# Patient Record
Sex: FEMALE | Race: BLACK OR AFRICAN AMERICAN | Employment: FULL TIME | URBAN - METROPOLITAN AREA
[De-identification: names, ages, dates, MRNs, and addresses within clinical notes are randomized per-mention and may not be internally consistent; named-entity substitution may affect disease eponyms.]

---

## 2019-10-09 LAB
ANTIBODY SCREEN, EXTERNAL: NEGATIVE
HBSAG, EXTERNAL: NEGATIVE
HIV, EXTERNAL: NEGATIVE
RUBELLA, EXTERNAL: NORMAL
TYPE, ABO & RH, EXTERNAL: NORMAL

## 2019-12-30 ENCOUNTER — HOSPITAL ENCOUNTER (OUTPATIENT)
Dept: PERINATAL CARE | Age: 29
Discharge: HOME | End: 2019-12-30
Attending: OBSTETRICS & GYNECOLOGY
Payer: COMMERCIAL

## 2019-12-30 PROCEDURE — 76805 OB US >/= 14 WKS SNGL FETUS: CPT | Performed by: OBSTETRICS & GYNECOLOGY

## 2020-03-11 LAB — GRBS, EXTERNAL: NEGATIVE

## 2020-03-13 ENCOUNTER — HOSPITAL ENCOUNTER (OUTPATIENT)
Age: 30
Discharge: HOME | End: 2020-03-14
Attending: OBSTETRICS & GYNECOLOGY | Admitting: OBSTETRICS & GYNECOLOGY
Payer: COMMERCIAL

## 2020-03-13 VITALS
TEMPERATURE: 98.5 F | RESPIRATION RATE: 18 BRPM | DIASTOLIC BLOOD PRESSURE: 53 MMHG | BODY MASS INDEX: 37.77 KG/M2 | HEART RATE: 124 BPM | HEIGHT: 69 IN | SYSTOLIC BLOOD PRESSURE: 111 MMHG | WEIGHT: 255 LBS

## 2020-03-13 LAB
APPEARANCE UR: ABNORMAL
BACTERIA URNS QL MICRO: NEGATIVE /HPF
BILIRUB UR QL CFM: NEGATIVE
COLOR UR: ABNORMAL
EPITH CASTS URNS QL MICRO: ABNORMAL /LPF
GLUCOSE UR STRIP.AUTO-MCNC: NEGATIVE MG/DL
HGB UR QL STRIP: NEGATIVE
KETONES UR QL STRIP.AUTO: 80 MG/DL
LEUKOCYTE ESTERASE UR QL STRIP.AUTO: ABNORMAL
NITRITE UR QL STRIP.AUTO: NEGATIVE
PH UR STRIP: 6.5 [PH] (ref 5–8)
PROT UR STRIP-MCNC: ABNORMAL MG/DL
RBC #/AREA URNS HPF: ABNORMAL /HPF (ref 0–5)
SP GR UR REFRACTOMETRY: 1.03 (ref 1–1.03)
UA: UC IF INDICATED,UAUC: ABNORMAL
UROBILINOGEN UR QL STRIP.AUTO: 1 EU/DL (ref 0.2–1)
WBC URNS QL MICRO: ABNORMAL /HPF (ref 0–4)

## 2020-03-13 PROCEDURE — 96361 HYDRATE IV INFUSION ADD-ON: CPT

## 2020-03-13 PROCEDURE — 87086 URINE CULTURE/COLONY COUNT: CPT

## 2020-03-13 PROCEDURE — 96360 HYDRATION IV INFUSION INIT: CPT

## 2020-03-13 PROCEDURE — 81001 URINALYSIS AUTO W/SCOPE: CPT

## 2020-03-13 PROCEDURE — 75810000275 HC EMERGENCY DEPT VISIT NO LEVEL OF CARE

## 2020-03-13 PROCEDURE — 74011250636 HC RX REV CODE- 250/636: Performed by: OBSTETRICS & GYNECOLOGY

## 2020-03-13 RX ORDER — SODIUM CHLORIDE, SODIUM LACTATE, POTASSIUM CHLORIDE, CALCIUM CHLORIDE 600; 310; 30; 20 MG/100ML; MG/100ML; MG/100ML; MG/100ML
1000 INJECTION, SOLUTION INTRAVENOUS ONCE
Status: COMPLETED | OUTPATIENT
Start: 2020-03-13 | End: 2020-03-13

## 2020-03-13 RX ADMIN — SODIUM CHLORIDE, SODIUM LACTATE, POTASSIUM CHLORIDE, AND CALCIUM CHLORIDE 1000 ML: 600; 310; 30; 20 INJECTION, SOLUTION INTRAVENOUS at 20:28

## 2020-03-13 RX ADMIN — SODIUM CHLORIDE, SODIUM LACTATE, POTASSIUM CHLORIDE, AND CALCIUM CHLORIDE 1000 ML: 600; 310; 30; 20 INJECTION, SOLUTION INTRAVENOUS at 21:30

## 2020-03-14 PROCEDURE — 99283 EMERGENCY DEPT VISIT LOW MDM: CPT

## 2020-03-14 PROCEDURE — 59025 FETAL NON-STRESS TEST: CPT

## 2020-03-14 NOTE — PROGRESS NOTES
1945: Patient arrived to unit with complaints of abdominal pain. Patient oriented to room, connected to external fetal monitors and triage flow-sheet complete. She reports good fetal movement and denies vaginal bleeding and leaking of fluid. Patient reports feeling pains occasionally but denies timing them. Urine collected, apple juice color and sent. Patient reports only 1.5 bottles of water today and states she was diagnosed with a UTI in December at [facility] but did not take the antibiotics as prescribed. Water pitcher filled for patient and RN instructed patient to drink over next 30 minutes or so. 0130Vastormy Murphy MD states patient may be discharged around 9025-0090. Patient required 2+ liters LR to become reactive. 0205: RN reviewed discharge instructions with patient including fetal kick counts, pregnancy nutrition and abdominal pain in pregnancy. Patient verbalizes understanding, opportunity for questions provided. Patient knows when to follow up with her regular OB.    0217:  Patient and SO ambulating off unit after discharge.

## 2020-03-14 NOTE — H&P
History & Physical    Name: Amarilis Chery MRN: [id number]  SSN: [id number]    YOB: 1990  Age: 34 y.o. Sex: female        Subjective:     Estimated Date of Delivery: 4/10/20  OB History    Para Term  AB Living   2 1 1     1   SAB TAB Ectopic Molar Multiple Live Births             1      # Outcome Date GA Lbr Guru/2nd Weight Sex Delivery Anes PTL Lv   2 Current            1 Term  37w0d   F Vag-Spont EPI  JENNA       Ms. Brown is a  with pregnancy at 36w0d, [facility] 4/10/20 that complains of irregular uterine contractions for the past 2 weeks that have increased in intensity and regularity today. Denies leakage of vaginal fluid, vaginal bleeding, and notes good fetal movement. Has had three episodes of nausea and vomiting and one episode of diarrhea today. Denies fever, chills, body aches, runny nose, sore throat, cough, sneezing, and shortness of breath. Prenatal care is complicated by obesity. Please see prenatal records for details. No past medical history on file. No past surgical history on file. Social History     Occupational History    Not on file   Tobacco Use    Smoking status: Former Smoker     Last attempt to quit: 2019     Years since quittin.6    Smokeless tobacco: Never Used   Substance and Sexual Activity    Alcohol use: No    Drug use: No    Sexual activity: Yes     Partners: Male     Birth control/protection: None   GynHx: h/o GC  Family History   Problem Relation Age of Onset    Hypertension Mother     Cancer Maternal Grandmother        No Known Allergies  Prior to Admission medications    Medication Sig Start Date End Date Taking? Authorizing Provider   prenatal multivit-ca-min-fe-fa tab Take 1 Tab by mouth daily. Yes Provider, Historical        Review of Systems: A comprehensive review of systems was negative.     Objective:     Vitals:  Vitals:    20   BP: 111/53   Pulse: (!) 124   Resp: 18   Temp: 98.5 °F (36.9 °C)   Weight: 115.7 kg (255 lb)   Height: 5' 9\" (1.753 m)        Physical Exam:  Patient without distress.   Heart: Regular rate and rhythm or S1S2 present  Lung: clear to auscultation throughout lung fields, no wheezes, no rales, no rhonchi and normal respiratory effort  Abdomen: soft, nontender, gravid  Fundus: soft and non tender  Perineum: blood absent, amniotic fluid absent  Cervical Exam: 2/40/-3 (exam by nurse)  Lower Extremities:  - Edema No  Membranes:  Intact  Fetal Heart Rate: Baseline: 165 per minute  Variability: moderate  Accelerations: no  Decelerations: none  Uterine contractions: none    Recent Results (from the past 12 hour(s))   URINALYSIS W/ REFLEX CULTURE    Collection Time: 20  8:05 PM   Result Value Ref Range    Color DARK YELLOW      Appearance TURBID (A) CLEAR      Specific gravity 1.028 1.003 - 1.030      pH (UA) 6.5 5.0 - 8.0      Protein TRACE (A) NEG mg/dL    Glucose NEGATIVE  NEG mg/dL    Ketone 80 (A) NEG mg/dL    Blood NEGATIVE  NEG      Urobilinogen 1.0 0.2 - 1.0 EU/dL    Nitrites NEGATIVE  NEG      Leukocyte Esterase SMALL (A) NEG      WBC 5-10 0 - 4 /hpf    RBC 5-10 0 - 5 /hpf    Epithelial cells MODERATE (A) FEW /lpf    Bacteria NEGATIVE  NEG /hpf    UA:UC IF INDICATED URINE CULTURE ORDERED (A) CNI     BILIRUBIN, CONFIRM    Collection Time: 20  8:05 PM   Result Value Ref Range    Bilirubin UA, confirm NEGATIVE  NEG         Prenatal Labs:   Lab Results   Component Value Date/Time    Rubella, External IMMUNE 10/09/2019    GrBStrep, External negative 11/10/2016 08:00 AM    HBsAg, External NEGATIVE 10/09/2019    HIV, External NEGATIVE 10/09/2019    RPR, External non reactive 2016 08:00 AM    Gonorrhea, External negative 2016 08:00 AM    Chlamydia, External negative 2016 08:00 AM    ABO,Rh B POSITIVE 10/09/2019         Assessment/Plan:      at 36 wks with false labor, dehydration    Iv and po hydration  Will consider discharge home once fetal tracing has a normal baseline and is reactive.

## 2020-03-14 NOTE — DISCHARGE INSTRUCTIONS
Patient Education     Follow up at your regularly scheduled visit. Belly Pain in Pregnancy: Care Instructions  Your Care Instructions  When you're pregnant, any belly pain can be a worry. You may not want to call your doctor about every pain you have. But you don't want to miss something that is dangerous for you or your baby. Even if it feels familiar, belly pain can mean something new when you're pregnant. It's important to know when to call your doctor. It will also help to know how to care for yourself at home when your pain is not caused by anything harmful. · When belly pain is more severe or constant, see a doctor right away. · If you're sure your belly pain is a sign of labor, call your doctor. · When belly pain is brief, it's usually a normal part of pregnancy. It might be related to changes in the growing uterus. Or it could be the stretching of ligaments called round ligaments. These ligaments help support the uterus. Round ligament pain can be on either side of your belly. It can also be felt in your hips or groin. Follow-up care is a key part of your treatment and safety. Be sure to make and go to all appointments, and call your doctor if you are having problems. It's also a good idea to know your test results and keep a list of the medicines you take. How can you tell if belly pain is a sign of labor? When belly pain is caused by labor, it can feel like mild or menstrual-like cramps in your lower belly. These cramps are probably contractions. They can happen in your second or third trimester. You may also have:  · A steady, dull ache in your lower back, pelvis, or thighs. · A feeling of pressure in your pelvis or lower belly. · Changes in your vaginal discharge or a sudden release of fluid from the vagina. If you think you are in labor, call your doctor. How can you care for yourself at home? When belly pain is mild and is not a symptom of labor:  · Rest until you feel better.   · Take a warm bath. · Think about what you drink and eat:  ¨ Drink plenty of fluids. Choose water and other caffeine-free clear liquids until you feel better. ¨ Try eating small, frequent meals. If your stomach is upset, try bland, low-fat foods like plain rice, broiled chicken, toast, and yogurt. · Think about how you move if you are having brief pains from stretching of the round ligaments. ¨ Try gentle stretching. ¨ Move a little more slowly when turning in bed or getting up from a chair, so those ligaments don't stretch quickly. ¨ Lean forward a bit if you think you are going to cough or sneeze. When should you call for help? Call 911 anytime you think you may need emergency care. For example, call if:  · You have sudden, severe pain in your belly. · You have severe vaginal bleeding. Call your doctor now or seek immediate medical care if:  · You have new or worse belly pain or cramping. · You have any vaginal bleeding. · You have a fever. · You have symptoms of preeclampsia, such as:  ¨ Sudden swelling of your face, hands, or feet. ¨ New vision problems (such as dimness or blurring). ¨ A severe headache. · You think that you may be in labor. This means that you've had at least 8 contractions within 1 hour or at least 4 contractions within 20 minutes, even after you change your position and drink fluids. · You have symptoms of a urinary tract infection. These may include:  ¨ Pain or burning when you urinate. ¨ A frequent need to urinate without being able to pass much urine. ¨ Pain in the flank, which is just below the rib cage and above the waist on either side of the back. ¨ Blood in your urine. Watch closely for changes in your health, and be sure to contact your doctor if you are worried about your or your baby's health. Where can you learn more? Go to [employer]  Enter B275 in the search box to learn more about \"Belly Pain in Pregnancy: Care Instructions. \"   © 0872-6618 Healthwise, [employer]. Care instructions adapted under license by [employer] (which disclaims liability or warranty for this information). This care instruction is for use with your licensed healthcare professional. If you have questions about a medical condition or this instruction, always ask your healthcare professional. [address] any warranty or liability for your use of this information. Content Version: 61.1.885509; Current as of: November 12, 2015  Patient Education        Nutrition During Pregnancy: Care Instructions  Your Care Instructions  Healthy eating when you are pregnant is important for you and your baby. It can help you feel well and have a successful pregnancy and delivery. During pregnancy your nutrition needs increase. Even if you have excellent eating habits, your doctor may recommend a multivitamin to make sure you get enough iron and folic acid. Many pregnant women wonder how much weight they should gain. In general, women who were at a healthy weight before they became pregnant should gain between 25 and 35 pounds. Women who were overweight before pregnancy are usually advised to gain 15 to 25 pounds. Women who were underweight before pregnancy are usually advised to gain 28 to 40 pounds. Your doctor will work with you to set a weight goal that is right for you. Gaining a healthy amount of weight helps you have a healthy baby. Follow-up care is a key part of your treatment and safety. Be sure to make and go to all appointments, and call your doctor if you are having problems. It's also a good idea to know your test results and keep a list of the medicines you take. How can you care for yourself at home? · Eat plenty of fruits and vegetables. Include a variety of orange, yellow, and leafy dark-green vegetables every day. · Choose whole-grain bread, cereal, and pasta.  Good choices include whole wheat bread, whole wheat pasta, brown rice, and oatmeal.  · Get 4 or more servings of milk and milk products each day. Good choices include nonfat or low-fat milk, yogurt, and cheese. If you cannot eat milk products, you can get calcium from calcium-fortified products such as orange juice, soy milk, and tofu. Other non-milk sources of calcium include leafy green vegetables, such as broccoli, kale, mustard greens, turnip greens, bok richy, and brussels sprouts. · If you eat meat, pick lower-fat types. Good choices include lean cuts of meat and chicken or turkey without the skin. · Do not eat shark, swordfish, reyes mackerel, or tilefish. They have high levels of mercury, which is dangerous to your baby. You can eat up to 12 ounces a week of fish or shellfish that have low mercury levels. Good choices include shrimp, wild salmon, pollock, and catfish. Do not eat more than 6 ounces of tuna each week. · Heat lunch meats (such as turkey, ham, or bologna) to 165°F before you eat them. This reduces your risk of getting sick from a kind of bacteria that can be found in lunch meats. · Do not eat unpasteurized soft cheeses, such as brie, feta, fresh mozzarella, and blue cheese. They have a bacteria that could harm your baby. · Limit caffeine. If you drink coffee or tea, have no more than 1 cup a day. Caffeine is also found in kimberly. · Do not drink any alcohol. No amount of alcohol has been found to be safe during pregnancy. · Do not diet or try to lose weight. For example, do not follow a low-carbohydrate diet. If you are overweight at the start of your pregnancy, your doctor will work with you to manage your weight gain. · Tell your doctor about all vitamins and supplements you take. When should you call for help? Watch closely for changes in your health, and be sure to contact your doctor if you have any problems. Where can you learn more?   Go to http://sandie-anabel.info/  Enter Y785 in the search box to learn more about \"Nutrition During Pregnancy: Care Instructions. \"  Current as of: May 29, 2019Content Version: 12.4  © 9528-7700 [employer]. Care instructions adapted under license by [employer] (which disclaims liability or warranty for this information). If you have questions about a medical condition or this instruction, always ask your healthcare professional. [address] any warranty or liability for your use of this information. Patient Education        Counting Your Baby's Kicks: Care Instructions  Your Care Instructions  Counting your baby's kicks is one way your doctor can tell that your baby is healthy. Most women--especially in a first pregnancy--feel their baby move for the first time between 16 and 22 weeks. The movement may feel like flutters rather than kicks. Your baby may move more at certain times of the day. When you are active, you may notice less kicking than when you are resting. At your prenatal visits, your doctor will ask whether the baby is active. In your last trimester, your doctor may ask you to count the number of times you feel your baby move. Follow-up care is a key part of your treatment and safety. Be sure to make and go to all appointments, and call your doctor if you are having problems. It's also a good idea to know your test results and keep a list of the medicines you take. How do you count fetal kicks? · A common method of checking your baby's movement is to count the number of kicks or moves you feel in 1 hour. Ten movements (such as kicks, flutters, or rolls) in 1 hour are normal. Some doctors suggest that you count in the morning until you get to 10 movements. Then you can quit for that day and start again the next day. · Pick your baby's most active time of day to count. This may be any time from morning to evening. · If you do not feel 10 movements in an hour, your baby may be sleeping. Wait for the next hour and count again.   When should you call for help?  Call your doctor now or seek immediate medical care if:    · You noticed that your baby has stopped moving or is moving much less than normal.    Watch closely for changes in your health, and be sure to contact your doctor if you have any problems. Where can you learn more? Go to http://sandie-anabel.info/  Enter [id number] in the search box to learn more about \"Counting Your Baby's Kicks: Care Instructions. \"  Current as of: May 29, 2019Content Version: 12.4  © 1217-2067 Healthwise, Incorporated. Care instructions adapted under license by [employer] (which disclaims liability or warranty for this information). If you have questions about a medical condition or this instruction, always ask your healthcare professional. [address] any warranty or liability for your use of this information.

## 2020-03-15 LAB
BACTERIA SPEC CULT: NORMAL
CC UR VC: NORMAL
SERVICE CMNT-IMP: NORMAL

## 2020-04-13 ENCOUNTER — HOSPITAL ENCOUNTER (INPATIENT)
Age: 30
LOS: 2 days | Discharge: HOME | End: 2020-04-15
Attending: OBSTETRICS & GYNECOLOGY | Admitting: OBSTETRICS & GYNECOLOGY
Payer: COMMERCIAL

## 2020-04-13 PROBLEM — Z37.9 NORMAL LABOR: Status: ACTIVE | Noted: 2020-04-13

## 2020-04-13 LAB
BASOPHILS # BLD: 0 K/UL (ref 0–0.1)
BASOPHILS NFR BLD: 0 % (ref 0–1)
DIFFERENTIAL METHOD BLD: ABNORMAL
EOSINOPHIL # BLD: 0.1 K/UL (ref 0–0.4)
EOSINOPHIL NFR BLD: 1 % (ref 0–7)
ERYTHROCYTE [DISTWIDTH] IN BLOOD BY AUTOMATED COUNT: 13.6 % (ref 11.5–14.5)
HCT VFR BLD AUTO: 34.1 % (ref 35–47)
HGB BLD-MCNC: 11.1 G/DL (ref 11.5–16)
IMM GRANULOCYTES # BLD AUTO: 0.1 K/UL (ref 0–0.04)
IMM GRANULOCYTES NFR BLD AUTO: 1 % (ref 0–0.5)
LYMPHOCYTES # BLD: 1.9 K/UL (ref 0.8–3.5)
LYMPHOCYTES NFR BLD: 24 % (ref 12–49)
MCH RBC QN AUTO: 26.7 PG (ref 26–34)
MCHC RBC AUTO-ENTMCNC: 32.6 G/DL (ref 30–36.5)
MCV RBC AUTO: 82.2 FL (ref 80–99)
MONOCYTES # BLD: 0.6 K/UL (ref 0–1)
MONOCYTES NFR BLD: 7 % (ref 5–13)
NEUTS SEG # BLD: 5.2 K/UL (ref 1.8–8)
NEUTS SEG NFR BLD: 67 % (ref 32–75)
NRBC # BLD: 0 K/UL (ref 0–0.01)
NRBC BLD-RTO: 0 PER 100 WBC
PLATELET # BLD AUTO: 172 K/UL (ref 150–400)
PMV BLD AUTO: 11.7 FL (ref 8.9–12.9)
RBC # BLD AUTO: 4.15 M/UL (ref 3.8–5.2)
WBC # BLD AUTO: 7.8 K/UL (ref 3.6–11)

## 2020-04-13 PROCEDURE — 65270000029 HC RM PRIVATE

## 2020-04-13 PROCEDURE — 75410000002 HC LABOR FEE PER 1 HR: Performed by: OBSTETRICS & GYNECOLOGY

## 2020-04-13 PROCEDURE — 74011250636 HC RX REV CODE- 250/636: Performed by: ANESTHESIOLOGY

## 2020-04-13 PROCEDURE — 76060000078 HC EPIDURAL ANESTHESIA: Performed by: ANESTHESIOLOGY

## 2020-04-13 PROCEDURE — 99285 EMERGENCY DEPT VISIT HI MDM: CPT

## 2020-04-13 PROCEDURE — 74011000250 HC RX REV CODE- 250: Performed by: ANESTHESIOLOGY

## 2020-04-13 PROCEDURE — 85025 COMPLETE CBC W/AUTO DIFF WBC: CPT

## 2020-04-13 PROCEDURE — 36415 COLL VENOUS BLD VENIPUNCTURE: CPT

## 2020-04-13 PROCEDURE — 77030005513 HC CATH URETH FOL11 MDII -B

## 2020-04-13 PROCEDURE — 99281 EMR DPT VST MAYX REQ PHY/QHP: CPT

## 2020-04-13 RX ORDER — CALCIUM CARBONATE 200(500)MG
400 TABLET,CHEWABLE ORAL
Status: DISCONTINUED | OUTPATIENT
Start: 2020-04-13 | End: 2020-04-14 | Stop reason: HOSPADM

## 2020-04-13 RX ORDER — SODIUM CHLORIDE, SODIUM LACTATE, POTASSIUM CHLORIDE, CALCIUM CHLORIDE 600; 310; 30; 20 MG/100ML; MG/100ML; MG/100ML; MG/100ML
125 INJECTION, SOLUTION INTRAVENOUS CONTINUOUS
Status: DISCONTINUED | OUTPATIENT
Start: 2020-04-13 | End: 2020-04-14

## 2020-04-13 RX ORDER — NALOXONE HYDROCHLORIDE 0.4 MG/ML
0.4 INJECTION, SOLUTION INTRAMUSCULAR; INTRAVENOUS; SUBCUTANEOUS AS NEEDED
Status: DISCONTINUED | OUTPATIENT
Start: 2020-04-13 | End: 2020-04-14 | Stop reason: HOSPADM

## 2020-04-13 RX ORDER — ACETAMINOPHEN 325 MG/1
650 TABLET ORAL
Status: DISCONTINUED | OUTPATIENT
Start: 2020-04-13 | End: 2020-04-14 | Stop reason: HOSPADM

## 2020-04-13 RX ORDER — SODIUM CHLORIDE 0.9 % (FLUSH) 0.9 %
5-40 SYRINGE (ML) INJECTION AS NEEDED
Status: DISCONTINUED | OUTPATIENT
Start: 2020-04-13 | End: 2020-04-15 | Stop reason: HOSPADM

## 2020-04-13 RX ORDER — OXYTOCIN/0.9 % SODIUM CHLORIDE 30/500 ML
1 PLASTIC BAG, INJECTION (ML) INTRAVENOUS
Status: DISCONTINUED | OUTPATIENT
Start: 2020-04-13 | End: 2020-04-14 | Stop reason: HOSPADM

## 2020-04-13 RX ORDER — LIDOCAINE HYDROCHLORIDE 10 MG/ML
INJECTION INFILTRATION; PERINEURAL
Status: DISCONTINUED
Start: 2020-04-13 | End: 2020-04-14 | Stop reason: WASHOUT

## 2020-04-13 RX ORDER — EPHEDRINE SULFATE/0.9% NACL/PF 50 MG/5 ML
10 SYRINGE (ML) INTRAVENOUS
Status: DISCONTINUED | OUTPATIENT
Start: 2020-04-13 | End: 2020-04-14 | Stop reason: HOSPADM

## 2020-04-13 RX ORDER — FENTANYL/BUPIVACAINE/NS/PF 2-1250MCG
1-16 PREFILLED PUMP RESERVOIR EPIDURAL CONTINUOUS
Status: DISCONTINUED | OUTPATIENT
Start: 2020-04-13 | End: 2020-04-14 | Stop reason: HOSPADM

## 2020-04-13 RX ORDER — ONDANSETRON 2 MG/ML
4 INJECTION INTRAMUSCULAR; INTRAVENOUS
Status: DISCONTINUED | OUTPATIENT
Start: 2020-04-13 | End: 2020-04-14 | Stop reason: SDUPTHER

## 2020-04-13 RX ORDER — SODIUM CHLORIDE 0.9 % (FLUSH) 0.9 %
5-40 SYRINGE (ML) INJECTION EVERY 8 HOURS
Status: DISCONTINUED | OUTPATIENT
Start: 2020-04-13 | End: 2020-04-14

## 2020-04-13 RX ADMIN — Medication 10 ML/HR: at 23:50

## 2020-04-13 RX ADMIN — SODIUM CHLORIDE, SODIUM LACTATE, POTASSIUM CHLORIDE, AND CALCIUM CHLORIDE 1000 ML: 600; 310; 30; 20 INJECTION, SOLUTION INTRAVENOUS at 22:28

## 2020-04-13 RX ADMIN — BUPIVACAINE HYDROCHLORIDE 10 ML: 2.5 INJECTION, SOLUTION EPIDURAL; INFILTRATION; INTRACAUDAL; PERINEURAL at 23:40

## 2020-04-14 ENCOUNTER — ANESTHESIA (OUTPATIENT)
Dept: MOTHER INFANT UNIT | Age: 30
End: 2020-04-14
Payer: COMMERCIAL

## 2020-04-14 ENCOUNTER — ANESTHESIA EVENT (OUTPATIENT)
Dept: MOTHER INFANT UNIT | Age: 30
End: 2020-04-14
Payer: COMMERCIAL

## 2020-04-14 PROCEDURE — 75410000002 HC LABOR FEE PER 1 HR: Performed by: OBSTETRICS & GYNECOLOGY

## 2020-04-14 PROCEDURE — 75410000000 HC DELIVERY VAGINAL/SINGLE: Performed by: OBSTETRICS & GYNECOLOGY

## 2020-04-14 PROCEDURE — 77010026064 HC OXYGEN INFANT MED AIR MIN: Performed by: OBSTETRICS & GYNECOLOGY

## 2020-04-14 PROCEDURE — 75410000003 HC RECOV DEL/VAG/CSECN EA 0.5 HR: Performed by: OBSTETRICS & GYNECOLOGY

## 2020-04-14 PROCEDURE — 00HU33Z INSERTION OF INFUSION DEVICE INTO SPINAL CANAL, PERCUTANEOUS APPROACH: ICD-10-PCS | Performed by: ANESTHESIOLOGY

## 2020-04-14 PROCEDURE — 74011250636 HC RX REV CODE- 250/636: Performed by: OBSTETRICS & GYNECOLOGY

## 2020-04-14 PROCEDURE — 65270000029 HC RM PRIVATE

## 2020-04-14 PROCEDURE — 77030014125 HC TY EPDRL BBMI -B: Performed by: ANESTHESIOLOGY

## 2020-04-14 PROCEDURE — 74011250637 HC RX REV CODE- 250/637: Performed by: OBSTETRICS & GYNECOLOGY

## 2020-04-14 RX ORDER — NALOXONE HYDROCHLORIDE 0.4 MG/ML
0.4 INJECTION, SOLUTION INTRAMUSCULAR; INTRAVENOUS; SUBCUTANEOUS AS NEEDED
Status: DISCONTINUED | OUTPATIENT
Start: 2020-04-14 | End: 2020-04-15 | Stop reason: HOSPADM

## 2020-04-14 RX ORDER — BUPIVACAINE HYDROCHLORIDE 2.5 MG/ML
INJECTION, SOLUTION EPIDURAL; INFILTRATION; INTRACAUDAL AS NEEDED
Status: DISCONTINUED | OUTPATIENT
Start: 2020-04-13 | End: 2020-04-15 | Stop reason: HOSPADM

## 2020-04-14 RX ORDER — IBUPROFEN 800 MG/1
800 TABLET ORAL EVERY 8 HOURS
Status: DISCONTINUED | OUTPATIENT
Start: 2020-04-14 | End: 2020-04-15 | Stop reason: HOSPADM

## 2020-04-14 RX ORDER — HYDROCORTISONE ACETATE PRAMOXINE HCL 2.5; 1 G/100G; G/100G
CREAM TOPICAL AS NEEDED
Status: DISCONTINUED | OUTPATIENT
Start: 2020-04-14 | End: 2020-04-15 | Stop reason: HOSPADM

## 2020-04-14 RX ORDER — OXYCODONE AND ACETAMINOPHEN 5; 325 MG/1; MG/1
1 TABLET ORAL
Status: DISCONTINUED | OUTPATIENT
Start: 2020-04-14 | End: 2020-04-15 | Stop reason: HOSPADM

## 2020-04-14 RX ORDER — SIMETHICONE 80 MG
80 TABLET,CHEWABLE ORAL
Status: DISCONTINUED | OUTPATIENT
Start: 2020-04-14 | End: 2020-04-15 | Stop reason: HOSPADM

## 2020-04-14 RX ORDER — DOCUSATE SODIUM 100 MG/1
100 CAPSULE, LIQUID FILLED ORAL
Status: DISCONTINUED | OUTPATIENT
Start: 2020-04-14 | End: 2020-04-15 | Stop reason: HOSPADM

## 2020-04-14 RX ORDER — ZOLPIDEM TARTRATE 5 MG/1
5 TABLET ORAL
Status: DISCONTINUED | OUTPATIENT
Start: 2020-04-14 | End: 2020-04-15 | Stop reason: HOSPADM

## 2020-04-14 RX ORDER — DIPHENHYDRAMINE HCL 25 MG
25 CAPSULE ORAL
Status: DISCONTINUED | OUTPATIENT
Start: 2020-04-14 | End: 2020-04-15 | Stop reason: HOSPADM

## 2020-04-14 RX ORDER — SWAB
1 SWAB, NON-MEDICATED MISCELLANEOUS DAILY
Status: DISCONTINUED | OUTPATIENT
Start: 2020-04-14 | End: 2020-04-15 | Stop reason: HOSPADM

## 2020-04-14 RX ORDER — ACETAMINOPHEN 325 MG/1
650 TABLET ORAL
Status: DISCONTINUED | OUTPATIENT
Start: 2020-04-14 | End: 2020-04-15 | Stop reason: HOSPADM

## 2020-04-14 RX ORDER — OXYTOCIN/0.9 % SODIUM CHLORIDE 20/1000 ML
125-500 PLASTIC BAG, INJECTION (ML) INTRAVENOUS ONCE
Status: COMPLETED | OUTPATIENT
Start: 2020-04-14 | End: 2020-04-14

## 2020-04-14 RX ORDER — ONDANSETRON 2 MG/ML
4 INJECTION INTRAMUSCULAR; INTRAVENOUS
Status: DISCONTINUED | OUTPATIENT
Start: 2020-04-14 | End: 2020-04-15 | Stop reason: HOSPADM

## 2020-04-14 RX ADMIN — Medication 5000 MILLI-UNITS/HR: at 01:30

## 2020-04-14 RX ADMIN — Medication 1 TABLET: at 09:33

## 2020-04-14 RX ADMIN — DOCUSATE SODIUM 100 MG: 100 CAPSULE, LIQUID FILLED ORAL at 09:33

## 2020-04-14 RX ADMIN — OXYTOCIN 1 MILLI-UNITS/MIN: 10 INJECTION INTRAVENOUS at 00:10

## 2020-04-14 RX ADMIN — IBUPROFEN 800 MG: 800 TABLET ORAL at 17:20

## 2020-04-14 RX ADMIN — IBUPROFEN 800 MG: 800 TABLET ORAL at 01:30

## 2020-04-14 RX ADMIN — IBUPROFEN 800 MG: 800 TABLET ORAL at 09:33

## 2020-04-14 NOTE — PROGRESS NOTES
Bedside and Verbal shift change report given to KATERIN Ashby RN (oncoming nurse) by JANNA Smith RN (offgoing nurse). Report included the following information SBAR, Kardex, Intake/Output and MAR.

## 2020-04-14 NOTE — L&D DELIVERY NOTE
Delivery Summary    Patient: Gary Brown MRN: [id number]  SSN: [id number]    YOB: 1990  Age: 34 y.o. Sex: female       Information for the patient's :  Naheed Brown [[id number]]       Labor Events:    Labor: No    Steroids: None   Cervical Ripening Date/Time:       Cervical Ripening Type: None   Antibiotics During Labor:     Rupture Identifier: Sac 1    Rupture Date/Time: 2020 12:01 AM   Rupture Type: AROM   Amniotic Fluid Volume: Moderate    Amniotic Fluid Description: Clear    Amniotic Fluid Odor: None    Induction: None       Induction Date/Time:        Indications for Induction:      Augmentation: AROM   Augmentation Date/Time:      Indications for Augmentation:     Labor complications: None       Additional complications:        Delivery Events:  Indications For Episiotomy:     Episiotomy: None   Perineal Laceration(s): None   Repaired:     Periurethral Laceration Location:      Repaired:     Labial Laceration Location: right   Repaired:     Sulcal Laceration Location:     Repaired:     Vaginal Laceration Location:     Repaired:     Cervical Laceration Location:     Repaired:     Repair Suture: None   Number of Repair Packets:     Estimated Blood Loss (ml):  ml   Quantitative Blood Loss (ml)                Delivery Date: 2020    Delivery Time: 12:09 AM  Delivery Type: Vaginal, Spontaneous  Sex:  Male    Gestational Age: 36w2d   Delivery Clinician:  Ruslan James  Living Status: Living   Delivery Location: L&D            APGARS  One minute Five minutes Ten minutes   Skin color: 0   1        Heart rate: 2   2        Grimace: 2   2        Muscle tone: 2   2        Breathin   2        Totals: 8   9            Presentation: Vertex    Position: Right Occiput Anterior  Resuscitation Method:  Suctioning-bulb; Tactile Stimulation;C-PAP     Meconium Stained: None      Cord Information: 3 Vessels  Complications: None  Cord around:    Delayed cord clamping? Yes  Cord clamped date/time:   Disposition of Cord Blood: Discard    Blood Gases Sent?: No    Placenta:  Date/Time: 2020 12:13 AM  Removal: Expressed      Appearance: Normal     [place] Measurements:  Birth Weight: 3.635 kg      Birth Length: 51.4 cm      Head Circumference: 33.5 cm      Chest Circumference: 33.5 cm     Abdominal Girth: 33 cm    Other Providers:   JOHNNY Joyner;JONATAN IVAN;BERNIE BRUCE;NKECHI SANABRIA, Obstetrician;Primary Nurse;Primary [place] Nurse;Charge Nurse;Staff Nurse           Group B Strep:   Lab Results   Component Value Date/Time    GrBStrep, External Negative 2020     Delivery Narrative:  Patient progressed well without augmentation to C/C/0 with bulging membranes. AROM with clear fluid noted. Pushed through 4 contractions with  over intact perineum of liveborn male infant,    APGARS 8/9, weight 3.635 gm. Head delivered ANAND. No nuchal cord. Anterior (left) shoulder delivered with single maternal effort with posterior shoulder and body    following easily. Infant placed on maternal abdomen. Delayed cord clamping x 1 minute. Cord clamped x 2 and cut by FOB. Prophylactic    pitocin infusion initiated. Placenta delivered intact with 3v cord. On inspection, no perineal, vaginal or cervical lacerations found. 1.5 cm right labial abrasion made hemostatic with pressure. Fundus firm at U. Mother and baby    bonding in LDR. Delivery  ml.

## 2020-04-14 NOTE — ROUTINE PROCESS
Bedside and Verbal shift change report given to August Feldman RN (oncoming nurse) by Jb Trivedi (offgoing nurse). Report included the following information SBAR, Intake/Output, MAR and Recent Results.

## 2020-04-14 NOTE — PROGRESS NOTES
Post-Partum Day Number 0 Progress Note    Truddie Card Blunt       Information for the patient's :  Blunt, Male Jayla Menezes [[id number]]   Vaginal, Spontaneous   Patient doing well without significant complaint. Voiding without difficulty, normal lochia. Vitals:  Visit Vitals  /64   Pulse 86   Temp 98.1 °F (36.7 °C)   Resp 17   Ht 5' 9\" (1.753 m)   Wt 116.1 kg (256 lb)   SpO2 100%   Breastfeeding Unknown   BMI 37.80 kg/m²     Temp (24hrs), Av.3 °F (36.8 °C), Min:97.8 °F (36.6 °C), Max:98.8 °F (37.1 °C)        Exam:   Patient without distress. FF @ U-2 NT                LE NT w/o edema    Labs:     Lab Results   Component Value Date/Time    WBC 7.8 2020 09:38 PM    WBC 9.9 2016 09:26 AM    HGB 11.1 (L) 2020 09:38 PM    HGB 11.4 (L) 2016 09:26 AM    HCT 34.1 (L) 2020 09:38 PM    HCT 36.2 2016 09:26 AM    PLATELET 373  09:38 PM    PLATELET 060  09:26 AM       Recent Results (from the past 24 hour(s))   CBC WITH AUTOMATED DIFF    Collection Time: 20  9:38 PM   Result Value Ref Range    WBC 7.8 3.6 - 11.0 K/uL    RBC 4.15 3.80 - 5.20 M/uL    HGB 11.1 (L) 11.5 - 16.0 g/dL    HCT 34.1 (L) 35.0 - 47.0 %    MCV 82.2 80.0 - 99.0 FL    MCH 26.7 26.0 - 34.0 PG    MCHC 32.6 30.0 - 36.5 g/dL    RDW 13.6 11.5 - 14.5 %    PLATELET 711 341 - 083 K/uL    MPV 11.7 8.9 - 12.9 FL    NRBC 0.0 0  WBC    ABSOLUTE NRBC 0.00 0.00 - 0.01 K/uL    NEUTROPHILS 67 32 - 75 %    LYMPHOCYTES 24 12 - 49 %    MONOCYTES 7 5 - 13 %    EOSINOPHILS 1 0 - 7 %    BASOPHILS 0 0 - 1 %    IMMATURE GRANULOCYTES 1 (H) 0.0 - 0.5 %    ABS. NEUTROPHILS 5.2 1.8 - 8.0 K/UL    ABS. LYMPHOCYTES 1.9 0.8 - 3.5 K/UL    ABS. MONOCYTES 0.6 0.0 - 1.0 K/UL    ABS. EOSINOPHILS 0.1 0.0 - 0.4 K/UL    ABS. BASOPHILS 0.0 0.0 - 0.1 K/UL    ABS. IMM. GRANS. 0.1 (H) 0.00 - 0.04 K/UL    DF AUTOMATED           Assessment: PPD 0 s/p , stable    Plan:  1.  Continue routine postpartum care

## 2020-04-14 NOTE — ANESTHESIA PREPROCEDURE EVALUATION
Relevant Problems   No relevant active problems       Anesthetic History   No history of anesthetic complications            Review of Systems / Medical History  Patient summary reviewed and pertinent labs reviewed    Pulmonary  Within defined limits                 Neuro/Psych   Within defined limits           Cardiovascular                  Exercise tolerance: >4 METS     GI/Hepatic/Renal  Within defined limits              Endo/Other  Within defined limits           Other Findings              Physical Exam    Airway  Mallampati: II  TM Distance: 4 - 6 cm  Neck ROM: normal range of motion   Mouth opening: Normal     Cardiovascular    Rhythm: regular  Rate: normal         Dental    Dentition: Upper dentition intact and Lower dentition intact     Pulmonary  Breath sounds clear to auscultation               Abdominal         Other Findings            Anesthetic Plan    ASA: 2  Anesthesia type: epidural          Induction: Intravenous  Anesthetic plan and risks discussed with: Patient

## 2020-04-14 NOTE — ADT AUTH CERT NOTES
Facility Name: [facility]            
  
  
  
  
  
   
Patient Demographics Patient Name Saida Brown Sex Female  
1990 Address [address] [facility] [address] Phone [phone] (Home) Hospital Account Name Acct ID Class Status Primary Coverage Saida Brown [id number] INPATIENT Open [facility]  
  
   
Guarantor Account (for Hospital Account [de-identified]) Name Relation to Pt Service Area Active? Acct Type Saida Brown Self BONSC Yes Personal/Family Address Phone [address], [address] [phone](F)    
  
   
Coverage Information (for Hospital Account [de-identified]) 1. [place] CROSS/[facility] CROSS [facility]  
 
F/O Payor/Plan Subscriber  Subscriber Sex Precert # BLUE CROSS/[place] BL[place] [facility] 89 F Subscriber Subscriber # Saida Brown [id number] Grp # Group Name  
[id number] Address Phone [address] Policy Number Status Effective Date Benefits Phone [id number] -  - Auth/Cert 2. [address] [place]  
 
F/O Payor/Plan Subscriber  Subscriber Sex Precert # [id number] [facility]ly [facility] 89 F Subscriber Subscriber # Saida Brown [id number] Grp # Group Name  
 Gabrielle Neil Address Phone [facility] [id number] [address] Policy Number Status Effective Date Benefits Phone [id number] -  - Auth/Cert  
  
  
   
Admission Information Arrival Date/Time: 2020 Admit Date/Time: 2020 IP Adm. Date/Time: 2020 Admission Type: Emergency Point of Origin: Non-health Care Facility/self Admit Category:   
Means of Arrival: Car Primary Service: Obstetrics Secondary Service: N/A Transfer Source:  Service Area: [address] [place] [address] Unit: [facility] 3 [facility]HER INFANT Admit Provider: Jennifer Chahal MD Attending Provider: Meredith Garcia MD Referring Provider:   
Admission Information Attending Provider Admission Dx Admitted On  
Meredith Garcia MD Normal labor 20 Service Isolation Code Status OBSTETRICS  Full Code Allergies Advance Care Planning No Known Allergies Jump to the Activity    
Admission Information Unit/Bed: [facility] 3 MOTHER INFANT Service: OBSTETRICS Admitting provider: Jennifer Chahal MD Phone: [phone] Attending provider: Meredith Garcia MD Phone: [phone] PCP: Meredith Garcia MD Phone: [phone] Admission dx: pregnancy Patient class: I Admission type: ER    
Patient Demographics Patient Name Marbiel Hewitt 
[id number] Sex Female  
1990 Address [address] [facility] [address] Phone [phone] (Home) H&P Notes  
 
 H&P by Jennifer Chahal MD at 20 documented on ED to Hosp-Admission (Current) from 2020 in [facility] 3 [facility]HER INFANT Author: Jennifer Chahal MD Author Type: Physician Filed: 20 [id number] Note Status: Signed Cosign: Cosign Not Required Date of Service: 20 : Jennifer Chahal MD (Physician) Labor and Delivery Admission Note 2020 
  
34 y.o., [facility] American, female,  Estimated Date of Delivery: 4/10/20 by dates and [address] presents with c/o regular, painful uterine contractions at . Contractions started mid-day but started becoming regular around 1400. Reports good fetal movement, scant bleeding, and has no LOF. Denies HA/vision changes/RUQ pain. 
  
PNL: Blood type: B 
          RH: pos Rubella: immune SVII serology: NR  
          GBS status: Neg 
  
PMHX:  Maternal obesity 
  
PSHX:  Denies 
  
OB/GYN: G1) Uncomplicated term  6#9GT, G2) Current 
  
Meds: Home PNV 
  
      
Current Facility-Administered Medications Medication Dose Route Frequency  lactated ringers bolus infusion 1,000 mL  1,000 mL IntraVENous ONCE  
 lactated ringers bolus infusion 1,000 mL  1,000 mL IntraVENous ONCE  
 lactated ringers bolus infusion 500 mL  500 mL IntraVENous ONCE  
 fentaNYL 2mcg/mL - bupivacaine 0.125% pf epidural  1-16 mL/hr Epidural CONTINUOUS  
 ePHEDrine (PF) (MISTOLE) 10 mg/mL in NS syringe 10 mg  10 mg IntraVENous Q10MIN PRN  
 lactated ringers bolus infusion 500 mL  500 mL IntraVENous PRN  
 naloxone (NARCAN) injection 0.4 mg  0.4 mg IntraVENous PRN  
 calcium carbonate (TUMS) chewable tablet 400 mg [elemental]  400 mg Oral Q4H PRN  
 ondansetron (ZOFRAN) injection 4 mg  4 mg IntraVENous Q4H PRN  
 acetaminophen (TYLENOL) tablet 650 mg  650 mg Oral Q4H PRN  
 oxytocin (PITOCIN) 30 units/500 mL D5W  1 wesley-units/min IntraVENous TITRATE  
  
Allergies: No Known Allergies  
  
Pertinent ROS: Denies F/C. Denies N/V. Denies CP/Palp. Denies cough/wheeze/SOB. Denies HA/vision changes/RUQ pain. Denies lightheadedness/dizziness. Denies constipation/diarrhea. Denies dys/urg/freq. Denies extremity pain/swelling/weakness. Denies rash/bruise/bleed. Denies anx/dep. Denies cold/heat intol. Denies allergies. 
  
     
Family History Problem Relation Age of Onset  Hypertension Mother    
 Cancer Maternal Grandmother    
  
Social History  
  
     
Socioeconomic History  Marital status: SINGLE  
    Spouse name: Not on file  Number of children: Not on file  Years of education: Not on file  Highest education level: Not on file Tobacco Use  Smoking status: Former Smoker  
    Last attempt to quit: 2019  
    Years since quittin.7  Smokeless tobacco: Never Used Substance and Sexual Activity  Alcohol use: No  
 Drug use: No  
 Sexual activity: Yes  
    Partners: Male  
    Birth control/protection: None  
  
  
OBJECTIVE: 
Gravid , female NAD, A&O 
 Temp (24hrs), Av.8 °F (37.1 °C), Min:98.8 °F (37.1 °C), Max:98.8 °F (37.1 °C) BMI 37.8 Visit Vitals /65 Pulse 100 Temp 98.8 °F (37.1 °C) Ht 5' 9\" (1.753 m) Wt 116.1 kg (256 lb) BMI 37.80 kg/m²  
  
Exam: 
HEENT:  normal  
Lungs:  clear Cor:  RRR Abdomen:  Fundal height CWD Soft between UC Clinical EFW 7.5# 
                  Cephalic Fetal heart rate tracin baseline, moderate variability, +accels, no decels, Cat I Contraction pattern: irritability with ctx's q 4-10 min Cervix:  5/80/-2/BB/cephalic Fluid:  Intact Pelvimetry:  Proven to 7#3oz 
  
Impression: 33 yo  at 40+3. Labor. Reassuring fetal status. GBS neg. Rh pos. RI. 
  
Plan: 1. Admit for delivery 2. PIV/CBC/Sample to BB 3. AROM/Augment prn 4.  Epidural prn 
5. Anticipate vaginal delivery.  for standard fetal/maternal indications. 
  
Karina Lu MD  
  
Patient Demographics Patient Name Neli Nixon 
[id number] Sex Female  
1990 Address [address] [place] [address] Phone [phone] (Home) CSN:  
[id number] Admit Date: Admit Time Room Bed 2020  8:36 PM [address] [facility] [[id number]] 01 [[id number]] Attending Providers Provider Pager From To  
Alka Macias MD     
Emergency Contact(s) Name Relation Home Work Mobile Cesar Croft Mother [phone] MOM CHART- Link to Michela Vizcarra Comment Last edited by  on  at [de-identified] name MRN Account  Age Sex Admission Type  
Jacob Serrano Male Bony Cabral [id number] / 0 days M Confirmed Admission - L&D [place] OB History Constanza Bio 2 Para 2 Term 2   
   
 AB  
   
 Living  
2 SAB  
   
 TAB  
   
 Ectopic  
   
 Molar  
   
 Multiple  
0 Live Births 2  
  
   
# Outcome Date GA Labor/2nd Weight Sex Delivery Anes PTL Lv A1 A5  
1 Term  41w0d   F Vag-Spont Epidural  Living 2 Term 20 40w4d  3.635 kg M Vag-Spont Epidural N Living 8 9 Name: Cristy Holguin Location: Other Delivering Clinician: Daly Baker MD  
  
Prenatal History Most Recent Value Did You Receive Prenatal Care Yes Name Of [facility] Provider Andrés Seen By MFM (Maternal Fetal Medicine)? No  
Fetal Ultrasound Abnormalities/Concerns? Yes Infant Feeding Breast Milk and Formula Circumcision Planned Yes Pediatrician After Birth/ Follow Up Baby Visits [place] Dating Summary Working SELVIN: 04/10/20 set by Nora Alcantara RN on 20 based on Other Basis Based On SELVIN GA Dif GA User Date Other Basis  04/10/20 Working  Nora Alcantara RN 20 Prenatal Results Prenatal Labs Test Value Date Time ABO/Rh * B POSITIVE  10/09/19 Antibody Scrn * NEGATIVE  10/09/19 Hgb Hct Platelet Rubella * IMMUNE  10/09/19 RPR     
T. Pallidum Antibody Urine Hep B Surf AG * NEGATIVE  10/09/19 Hep C     
HIV * NEGATIVE  10/09/19 Gonorrhea Chlamydia TSH     
GTT, 1 HR (Glucola) GTT, Fasting GTT, 1 HR     
GTT, 2 HR     
GTT, 3 HR     
3rd Trimester Test Value Date Time Hgb Hct Platelet Group B Strep * Negative  20 Antibody Scrn     
TSH     
T. Pallidum Antibody Hep B Surf AG Gonorrhea Chlamydia Screening/Diagnostics Test Value Date Time  
AFP Only AFP Tetra Hgb Electrophoresis Amniocentesis Cystic Fibrosis [facility] PAP Smear FREE BETA HCG     
NT     
NIPT Additional Results Test Value Date Time GTT, Fasting GTT, 1HR     
GTT, 2HR     
GTT, 3HR     
RPR * non reactive  16 0800 FREE BETA HCG     
CMV AB     
TOXOPLASMA AB     
VARICELLA ZOSTER AB Legend *: Historical  
View all results for this pregnancy Blunt, Male Kristen Ricki [[id number]]  Delivery Birth date/time: 2020 00:09:36 Delivery type: Vaginal, Spontaneous Labor Event Times Start pushing date/time: 2020 0001 Labor Events  labor?: No  
 steroids?: None Cervical ripening type: None Sac identifier: Sac 1 Rupture date/time: 2020 Rupture type: AROM Fluid color: Clear Fluid odor: None Induction: None Augmentation: AROM Labor/Delivery complications: None Delivery Providers Delivering clinician: Jaimie Ibrahim MD  
Provider Role Jaimie Ibrahim MD Obstetrician Claudia Sarabia Primary Nurse Maru Juarez, RN Primary  Nurse Mahnaz Gunter RN Charge Nurse Hue Muñoz, RN Staff Nurse Anesthesia Method: Epidural  
Multiple Birth Onset Second Stage No data filed Operative Delivery Forceps attempted?: No  
Vacuum extractor attempted?: No  
Presentation Presentation: Vertex Position: Right Occiput Anterior Apgars Living status: Living Apgars:  1 min. :  5 min.:  10 min. :  15 min.:  20 min.:   
Skin color:  0  1 Heart rate:  2  2 Reflex irritability:  2  2 Muscle tone:  2  2 Respiratory effort:  2  2 Total:  8  9 Apgars assigned by: Elma Littlejohngojaycee RN Resuscitation Method: Suctioning-bulb, Tactile Stimulation, C-PAP Shoulder Dystocia Shoulder dystocia present?: No  
Measurements Weight: 3635 g Length: 51.4 cm Head circumference: 33.5 cm Chest circumference: 33.5 cm Abdominal girth: 33 cm Lacerations Episiotomy: None Lacerations: None Repair Needed: None # of Repair Packets:   
Suture Type and Size:   
Suture Comment:   
Estimated Blood Loss (mL):    
Placenta Placenta Date/Time: 2020 0013 Removal: Expressed Appearance: Normal Placenta disposition: Discarded Vaginal Counts Initial count personnel: Suresh Yao RN,DR Luis M Copeland Final count personnel: DR Mary Valdez Accurate final count?: Yes  
 Delivery Summary History Event User Date/Time Signed Susan Burks 4/14/2020 00:34:20 Opened for Addendum Almaz Sol RN 4/14/2020 01:41:03 Signed Ronald Grewla MD 4/14/2020 02:16:19

## 2020-04-14 NOTE — H&P
Labor and Delivery Admission Note  2020    34 y.o., [place] American, female,  Estimated Date of Delivery: 4/10/20 by dates and US presents with c/o regular, painful uterine contractions at . Contractions started mid-day but started becoming regular around 1400. Reports good fetal movement, scant bleeding, and has no LOF. Denies HA/vision changes/RUQ pain. PNL: Blood type: B            RH: pos            Rubella: immune            SVII serology: NR             GBS status: Neg    PMHX:  Maternal obesity    PSHX:  Denies    OB/GYN: G1) Uncomplicated term  4#1FL, G2) Current    Meds: Home PNV    Current Facility-Administered Medications   Medication Dose Route Frequency    lactated ringers bolus infusion 1,000 mL  1,000 mL IntraVENous ONCE    lactated ringers bolus infusion 1,000 mL  1,000 mL IntraVENous ONCE    lactated ringers bolus infusion 500 mL  500 mL IntraVENous ONCE    fentaNYL 2mcg/mL - bupivacaine 0.125% pf epidural  1-16 mL/hr Epidural CONTINUOUS    ePHEDrine (PF) (MISTOLE) 10 mg/mL in NS syringe 10 mg  10 mg IntraVENous Q10MIN PRN    lactated ringers bolus infusion 500 mL  500 mL IntraVENous PRN    naloxone (NARCAN) injection 0.4 mg  0.4 mg IntraVENous PRN    calcium carbonate (TUMS) chewable tablet 400 mg [elemental]  400 mg Oral Q4H PRN    ondansetron (ZOFRAN) injection 4 mg  4 mg IntraVENous Q4H PRN    acetaminophen (TYLENOL) tablet 650 mg  650 mg Oral Q4H PRN    oxytocin (PITOCIN) 30 units/500 mL D5W  1 wesley-units/min IntraVENous TITRATE     Allergies: No Known Allergies     Pertinent ROS: Denies F/C. Denies N/V. Denies CP/Palp. Denies cough/wheeze/SOB. Denies HA/vision changes/RUQ pain. Denies lightheadedness/dizziness. Denies constipation/diarrhea. Denies dys/urg/freq. Denies extremity pain/swelling/weakness. Denies rash/bruise/bleed. Denies anx/dep. Denies cold/heat intol. Denies allergies.     Family History   Problem Relation Age of Onset    Hypertension Mother     Cancer Maternal Grandmother      Social History     Socioeconomic History    Marital status: SINGLE     Spouse name: Not on file    Number of children: Not on file    Years of education: Not on file    Highest education level: Not on file   Tobacco Use    Smoking status: Former Smoker     Last attempt to quit: 2019     Years since quittin.7    Smokeless tobacco: Never Used   Substance and Sexual Activity    Alcohol use: No    Drug use: No    Sexual activity: Yes     Partners: Male     Birth control/protection: None       OBJECTIVE:  Gravid , female NAD, A&O  Temp (24hrs), Av.8 °F (37.1 °C), Min:98.8 °F (37.1 °C), Max:98.8 °F (37.1 °C)  BMI 37.8  Visit Vitals  /65   Pulse 100   Temp 98.8 °F (37.1 °C)   Ht 5' 9\" (1.753 m)   Wt 116.1 kg (256 lb)   BMI 37.80 kg/m²     Exam:  HEENT:  normal   Lungs:  clear  Cor:  RRR  Abdomen:  Fundal height CWD                    Soft between UC                    Clinical EFW 7.5#                    Cephalic  Fetal heart rate tracin baseline, moderate variability, +accels, no decels, Cat I  Contraction pattern: irritability with ctx's q 4-10 min  Cervix:  5/80/-2/BB/cephalic  Fluid:  Intact  Pelvimetry:  Proven to 7#3oz    Impression: 33 yo  at 40+3. Labor. Reassuring fetal status. GBS neg. Rh pos.  RI. Plan:  1. Admit for delivery  2. PIV/CBC/Sample to BB  3. AROM/Augment prn  4.  Epidural prn  5. Anticipate vaginal delivery.  for standard fetal/maternal indications.     Bee Nielson MD

## 2020-04-14 NOTE — ROUTINE PROCESS
2042  Patient arrives to L & D, reporting contractions that started at noon and are getting closer, 10 minutes apart per patient report. Patient has been oriented to room and unit. Patient changed into gown, EFM and TOCO placed. 2058  SVE 5/80/-2. 
 
2105  Dr Vizcaino updated on patient arrival.  
 
2124  Dr Vizcaino at bedside assessing the patient and is talking about the plan of care. Patient will be admitted to L&D for labor. Patient verbalized understanding. Consents signed. Peripheral IV started. 2157  Patient ambukates to bathroom. 2220  Patient ambulates to bathroom. Patient is requesting the fluid bolus start for an epidural. 
2228  Fluid bolus started. 2308  Dr Sumit Dominguez notified of fluid bolus complete. 2312  Dr Sumit Dominguez at bedside assessing and educating the patient about the epidural. 
2314  Time Out 
2324  Test Dose. Dr Sumit Dominguez is called to a Code Blue at this time. 2334  Patient is repositioned onto left side. Dermatomes tested, patient reports to be feeling cold, wet and touch everywhere. Fentanyl will be hung. 2350  Fentanyl hung and patient pushes for bolus dose. 2355  Patient is reporting increased pressure, and the urge to push. SVE 10/100/+1 Stanton Petties charge RN notified. Patient has been repositioned, with legs in stirups. 2258  Dr Vizcaino at bedside ready for delivery. . 
0001  Dr. Vizcaino AROM, moderate amount of fluid noted. Patient has been educated on how to push. Patient is baring down with each contraction. 0009  Viable male infant born. Infant has been placed on maternal abdomen, dried and stimulated. 0013  Placenta delivered. Fundal check. 0017  Fundal check. QBL at delivery 200 ml + 67 post 2 h = 267 ml TOTAL 
 
0320  Patient ambulates to bathroom and voids 300 ml of clear urine. Epidural catheter removed. Back care completed. Ana Lilia care, fresh gown and fresh pads provided.   
 
9976  TRANSFER - OUT REPORT: 
 
 Verbal report given to Kenia Alaniz RN(name) on Sari Brown  being transferred to MIU(unit) for routine progression of care Report consisted of patients Situation, Background, Assessment and  
Recommendations(SBAR). Information from the following report(s) SBAR, Kardex, Procedure Summary, Intake/Output, MAR, Accordion, Recent Results and Med Rec Status was reviewed with the receiving nurse. Lines:  
Peripheral IV 04/13/20 Anterior;Right Hand (Active) Site Assessment Clean, dry, & intact 4/13/2020 10:38 PM  
Phlebitis Assessment 0 4/13/2020 10:38 PM  
Infiltration Assessment 0 4/13/2020 10:38 PM  
Dressing Status Clean, dry, & intact 4/13/2020 10:38 PM  
Dressing Type Tape;Transparent 4/13/2020 10:38 PM  
Hub Color/Line Status Pink 4/13/2020 10:38 PM  
  
 
Opportunity for questions and clarification was provided. Patient transported with: 
 Registered Nurse

## 2020-04-14 NOTE — ANESTHESIA PROCEDURE NOTES
Epidural Block    Start time: 4/13/2020 11:24 PM  End time: 4/13/2020 11:40 PM  Performed by: Wilfrido Arnold MD  Authorized by: Wilfrido Arnold MD     Pre-Procedure  Indication: labor epidural    Preanesthetic Checklist: patient identified, risks and benefits discussed, anesthesia consent, timeout performed and anesthesia consent    Timeout Time: 23:24        Epidural:   Patient position:  Seated  Prep region:  Lumbar  Prep: Betadine    Location:  L3-4    Needle and Epidural Catheter:   Needle Type:  Tuohy  Needle Gauge:  17 G  Injection Technique:  Loss of resistance using air  Attempts:  1  Catheter Size:  18 G  Catheter at Skin Depth (cm):  9  Depth in Epidural Space (cm):  4  Events: no paresthesia and negative aspiration test    Test Dose:  Lidocaine 1.5% w/ epi and negative    Assessment:   Catheter Secured:  Tegaderm and tape  Insertion:  Uncomplicated  Patient tolerance:  Patient tolerated the procedure well with no immediate complications  Had to leave epidural placement abruptly and unable to assess level, planning to reassess after return but patient delivering.

## 2020-04-14 NOTE — LACTATION NOTE
This note was copied from a baby's chart. Reviewed breastfeeding basics:  Supply and demand,  stomach size, early  Feeding cues, skin to skin, positioning and baby led latch-on, assymetrical latch with signs of good, deep latch vs shallow, feeding frequency and duration, and log sheet for tracking infant feedings and output. Breastfeeding Booklet and Warm line information given. Discussed typical  weight loss and the importance of infant weight checks with pediatrician 1-2 post discharge. Hand Expression Education:  Mom taught how to manually hand express her colostrum. Emphasized the importance of providing infant with valuable colostrum as infant rests skin to skin at breast.  Aware to avoid extended periods of non-feeding. Aware to offer 10-20+ drops of colostrum every 2-3 hours until infant is latching and nursing effectively. Taught the rationale behind this low tech but highly effective evidence based practice. Scant drops noted. Pt instructed on use of devices for treatment of flat/inverted nipples: LatchAssist and/or Breast Shells. Discussed with mother her plan for feeding. Reviewed the benefits of exclusive breast milk feeding during the hospital stay. Informed her of the risks of using formula to supplement in the first few days of life as well as the benefits of successful breast milk feeding; referred her to the Breastfeeding booklet about this information. She acknowledges understanding of information reviewed and states that it is her plan to formula feed and breast feedher infant. Will support her choice and offer additional information as needed. Pt will successfully establish breastfeeding by feeding in response to early feeding cues   or wake every 3h, will obtain deep latch, and will keep log of feedings/output. Taught to BF at hunger cues and or q 2-3 hrs and to offer 10-20 drops of hand expressed colostrum at any non-feeds.       Breast Assessment  Left Breast: Extra large  Left Nipple: Flat, Inverted  Right Breast: Extra large  Right Nipple:  Inverted, Flat  Breast- Feeding Assessment  Attends Breast-Feeding Classes: No  Breast-Feeding Experience: No  Breast Trauma/Surgery: No  Lactation Consultant Visits  Breast-Feedings: (has not attempted yet) old records

## 2020-04-15 VITALS
HEIGHT: 69 IN | WEIGHT: 256 LBS | BODY MASS INDEX: 37.92 KG/M2 | OXYGEN SATURATION: 100 % | RESPIRATION RATE: 16 BRPM | HEART RATE: 79 BPM | DIASTOLIC BLOOD PRESSURE: 69 MMHG | TEMPERATURE: 98.4 F | SYSTOLIC BLOOD PRESSURE: 120 MMHG

## 2020-04-15 LAB
HCT VFR BLD AUTO: 32.5 % (ref 35–47)
HGB BLD-MCNC: 10.2 G/DL (ref 11.5–16)

## 2020-04-15 PROCEDURE — 85018 HEMOGLOBIN: CPT

## 2020-04-15 PROCEDURE — 36415 COLL VENOUS BLD VENIPUNCTURE: CPT

## 2020-04-15 PROCEDURE — 74011250637 HC RX REV CODE- 250/637: Performed by: OBSTETRICS & GYNECOLOGY

## 2020-04-15 RX ORDER — IBUPROFEN 800 MG/1
800 TABLET ORAL
Qty: 40 TAB | Refills: 1 | Status: SHIPPED | OUTPATIENT
Start: 2020-04-15

## 2020-04-15 RX ADMIN — DOCUSATE SODIUM 100 MG: 100 CAPSULE, LIQUID FILLED ORAL at 09:02

## 2020-04-15 RX ADMIN — Medication 1 TABLET: at 09:02

## 2020-04-15 RX ADMIN — IBUPROFEN 800 MG: 800 TABLET ORAL at 02:14

## 2020-04-15 RX ADMIN — IBUPROFEN 800 MG: 800 TABLET ORAL at 10:50

## 2020-04-15 NOTE — DISCHARGE INSTRUCTIONS
Discharge Instructions for Vaginal Delivery    Patient ID:  Delaney Brown  [id number]  34 y.o.  1990    Take Home Medications   Continue taking your prenatal vitamins if you are breastfeeding. Follow-up care is a key part of your treatment and safety. Please schedule and keep appointments. Follow-up with your primary OB in 6 weeks. Activity  Avoid anything in your vagina for 6 weeks (no intercourse, tampons, or douching). You may drive unless you are taking prescription pain medications. Climbing stairs and light lifting are okay. Please avoid excessive exercise, though walking is okay- you'll be tired! Diet  Regular diet as tolerated. Be sure to drink plenty of fluids if you are breastfeeding. Wound care  If you have stitches, continue to rinse with a squirt bottle of warm water each time you void for about 7-10 days. .  Your stitches will gradually dissolve over four to eight weeks. Sitz baths are also helpful to keep the wound clean, encourage healing, and to help with pain associated with the stitches or hemorrhoids. You can use either a sitz bath basin or a bathtub filled with 2-3\" inches of plain warm water. Soak for 10 minutes 3 times a day as tolerated. Pain Management  1. Over the counter medications such as Tylenol and ibuprofen (Motrin or Advil) are ideal.  These may be taken together, alternating doses. You may  take the maximum dose:  Motrin or Advil (generic ibuprofen), either 3 tablets every 6 hours or 4 tablets every 8 hours or Tylenol (acetominophen) 1000mg every 6 hours (equivalent to 2 extra strength Tylenol). 2. You may also have a precrescription for stronger pain medication. Take only as needed and transition to over the counter medication in the next few days. Minimize amounts of the prescription medication, as it can be habit-forming and will worsen or cause constipation.  Most patients will find that within a couple of days, their pain is adequately controlled using only over-the-counter medications. 3. The prescription pain medication is mixed with Tylenol, therefore, you should not take any extra Tylenol or acetaminophen until you have reduced your prescription pain medication. 4. Add heating pad or sitz baths as needed. Add hemorrhoid wipes or ointments if needed    Constipation  1. Constipation is normal after pregnancy and delivery, especially while taking prescription narcotic pain medication. 2. Over the counter remedies including ducosate (Colace), take 1-2 capsules 1-2 times daily for soft stool as needed. You may also add/ try milk of magnesia or rectal remedies such as Dulcolax or Fleets enema. Recovery: What to Expect at Home  1. Fatigue is expected. Try to rest when you can and don't worry about doing housework or other tasks which can wait. 2. The soreness along your bottom will improve significantly over the first 2 weeks, but it may take 6 weeks before you are completely recovered. 3. Back pain or general body aches or muscle soreness are expected and should improve with acetominophen or ibuprofen. 4. Leg swelling due to pregnancy and/or IV fluids given in the hospital will take about two weeks to resolve. 5. Most women experience some form of the \"Baby Blues\" after having a baby. Feeling emotional, tearful, frustrated, anxious, sad, and irritable some of the time is normal and go away after about 2 weeks. Adequate rest and help from your family will help. Take breaks from caring for the baby. Call your doctor if your symptoms seem severe, last more than 2 weeks, or seem to be getting worse instead of better. Get help immediately if you have thoughts of wanting to hurt yourself or others! Call your doctor or seek immediate medical care if you have:  Heavy vaginal bleeding, soaking through one or more pads an hour for several hours. Foul-smelling discharge from your vagina or incision.   Consistent nausea and vomiting and cannot keep fluids down. Consistent pain that does not get better after you take pain medicine. Sudden chest pain and shortness of breath  Signs of a blood clot: pain/ swelling/ increasing redness in your lower extremeties  Signs of infection: increased pain in your abdomen or vaginal area; red streaks, warmth, or tenderness of your breasts; fever of 100.5 F or greater    POSTPARTUM DISCHARGE INSTRUCTIONS       Name:  Rigo Brown  YOB: 1990  Admission Diagnosis:  Normal labor [O80, Z37.9]     Discharge Diagnosis:    Problem List as of 4/15/2020 Never Reviewed          Codes Class Noted - Resolved    Normal labor ICD-10-CM: O80, Z37.9  ICD-9-CM: 661  4/13/2020 - Present        Pregnancy ICD-10-CM: Z34.90  ICD-9-CM: V22.2  12/13/2016 - Present            Attending Physician:  Taylor Garvin MD    Delivery Type:  Vaginal Childbirth: What To Expect At Home    Your Recovery: Your body will slowly heal in the next few weeks. It is easy to get too tired and overwhelmed during the first weeks after your baby is born. Changes in your hormones can shift your mood without warning. You may find it hard to meet the extra demands on your energy and time. Take it easy on yourself. Follow-up care is a key part of your treatment and safety. Be sure to make and go to all appointments, and call your doctor if you are having problems. It's also a good idea to know your test results and keep a list of the medicines you take. How can you care for yourself at home? Vaginal bleeding and cramps  · After delivery, you will have a bloody discharge from the vagina. This will turn pink within a week and then white or yellow after about 10 days. It may last for 2 to 4 weeks or longer, until the uterus has healed. Use pads instead of tampons until you stop bleeding. · Do not worry if you pass some blood clots, as long as they are smaller than a golf ball.  If you have a tear or stitches in your vaginal area, change the pad at least every 4 hours to prevent soreness and infection. · You may have cramps for the first few days after childbirth. These are normal and occur as the uterus shrinks to normal size. Take an over-the-counter pain medicine, such as acetaminophen (Tylenol), ibuprofen (Advil, Motrin), or naproxen (Aleve), for cramps. Read and follow all instructions on the label. Do not take aspirin, because it can cause more bleeding. Do not take acetaminophen (Tylenol) and other acetaminophen containing medications (i.e. Percocet) at the same time. Breast fullness  · Your breasts may overfill (engorge) in the first few days after delivery. To help milk flow and to relieve pain, warm your breasts in the shower or by using warm, moist towels before nursing. · If you are not nursing, do not put warmth on your breasts or touch your breasts. Wear a tight bra or sports bra and use ice until the fullness goes away. This usually takes 2 to 3 days. · Put ice or a cold pack on your breast after nursing to reduce swelling and pain. Put a thin cloth between the ice and your skin. Activity  · Eat a balanced diet. Do not try to lose weight by cutting calories. Keep taking your prenatal vitamins, or take a multivitamin. · Get as much rest as you can. Try to take naps when your baby sleeps during the day. · Get some exercise every day. But do not do any heavy exercise until your doctor says it is okay. · Wait until you are healed (about 4 to 6 weeks) before you have sexual intercourse. Your doctor will tell you when it is okay to have sex. · Talk to your doctor about birth control. You can get pregnant even before your period returns. Also, you can get pregnant while you are breast-feeding. Mental Health  · Many women get the \"baby blues\" during the first few days after childbirth. You may lose sleep, feel irritable, and cry easily. You may feel happy one minute and sad the next.  Hormone changes are one cause of these emotional changes. Also, the demands of a new baby, along with visits from relatives or other family needs, add to a mother's stress. The \"baby blues\" often peak around the fourth day. Then they ease up in less than 2 weeks. · If your moodiness or anxiety lasts for more than 2 weeks, or if you feel like life is not worth living, you may have postpartum depression. This is different for each mother. Some mothers with serious depression may worry intensely about their infant's well-being. Others may feel distant from their child. Some mothers might even feel that they might harm their baby. A mother may have signs of paranoia, wondering if someone is watching her. · With all the changes in your life, you may not know if you are depressed. Pregnancy sometimes causes changes in how you feel that are similar to the symptoms of depression. · Symptoms of depression include:  · Feeling sad or hopeless and losing interest in daily activities. These are the most common symptoms of depression. · Sleeping too much or not enough. · Feeling tired. You may feel as if you have no energy. · Eating too much or too little. · [employer] ([employer]I) offers a Warm line; Chat with the Expert phone sessions; Information and Articles about Pregnancy and Postpartum Mood Disorders; Comprehensive List of Free Support Groups; Knowledgeable local coordinators who will offer support, information, and resources; Guide to Resources on [employer]; Calendar of events in the  mood disorders community; Latest News and Research; and [facility] [address] for [employer]. Remember - You are not alone; You are not to blame; With help, you will be well. 1-765-537-PPD(6766). WWW. POSTPARTUM. NET   · Writing or talking about death, such as writing suicide notes or talking about guns, knives, or pills. Keep the numbers for these national suicide hotlines: 1-094-854-TALK (5-719.655.6703) and 8-138-GRPVLOQ (4-443.169.4726).  If you or someone you know talks about suicide or feeling hopeless, get help right away. Constipation and Hemorrhoids  · Drink plenty of fluids, enough so that your urine is light yellow or clear like water. If you have kidney, heart, or liver disease and have to limit fluids, talk with your doctor before you increase the amount of fluids you drink. · Eat plenty of fiber each day. Have a bran muffin or bran cereal for breakfast, and try eating a piece of fruit for a mid-afternoon snack. · For painful, itchy hemorrhoids, put ice or a cold pack on the area several times a day for 10 minutes at a time. Follow this by putting a warm compress on the area for another 10 to 20 minutes or by sitting in a shallow, warm bath. When should you call for help? Call 911 anytime you think you may need emergency care. For example, call if:  · You are thinking of hurting yourself, your baby, or anyone else. · You passed out (lost consciousness). · You have symptoms of a blood clot in your lung (called a pulmonary embolism). These may include:    · Sudden chest pain. · Trouble breathing. · Coughing up blood. Call your doctor now or seek immediate medical care if:  · You have severe vaginal bleeding. · You are soaking through a pad each hour for 2 or more hours. · Your vaginal bleeding seems to be getting heavier or is still bright red 4 days after delivery. · You are dizzy or lightheaded, or you feel like you may faint. · You are vomiting or cannot keep fluids down. · You have a fever. · You have new or more belly pain. · You pass tissue (not just blood). · Your vaginal discharge smells bad. · Your belly feels tender or full and hard. · Your breasts are continuously painful or red. · You feel sad, anxious, or hopeless for more than a few days. · You have sudden, severe pain in your belly.   · You have symptoms of a blood clot in your leg (called a deep vein thrombosis),          such as:  · Pain in your calf, back of the knee, thigh, or groin. · Redness and swelling in your leg or groin. · You have symptoms of preeclampsia, such as:  · Sudden swelling of your face, hands, or feet. · New vision problems (such as dimness or blurring). · A severe headache. · Your blood pressure is higher than it should be or rises suddenly. · You have new nausea or vomiting. Watch closely for changes in your health, and be sure to contact your doctor if you have any problems. Additional Information:  Preventing Infection at Home    We care about preventing infection and avoiding the spread of germs - not only when you are in the hospital but also when you return home. When you return home from the hospital, it's important to take the following steps to help prevent infection and avoid spreading germs that could infect you and others. Ask everyone in your home to follow these guidelines, too. Clean Your Hands  · Clean your hands whenever your hands are visibly dirty, before you eat, before or after touching your mouth, nose or eyes, and before preparing food. Clean them after contact with body fluids, using the restroom, touching animals or changing diapers. · When washing hands, wet them with warm water and work up a lather. Rub hands for at least 15 seconds, then rinse them and pat them dry with a clean towel or paper towel. · When using hand sanitizers, it should take about 15 seconds to rub your hands dry. If not, you probably didn't apply enough . Cover Your Sneeze or Cough  Germs are released into the air whenever you sneeze or cough. To prevent the spread of infection:  · Turn away from other people before coughing or sneezing. · Cover your mouth or nose with a tissue when you cough or sneeze. Put the tissue in the trash. · If you don't have a tissue, cough or sneeze into your upper sleeve, not your hands. · Always clean your hands after coughing or sneezing.     Care for Wounds  Your skin is your body's first line of defense against germs, but an open wound leaves an easy way for germs to enter your body. To prevent infection:  · Clean your hands before and after changing wound dressings, and wear gloves to change dressings if recommended by your doctor. · Take special care with IV lines or other devices inserted into the body. If you must touch them, clean your hands first.  · Follow any specific instructions from your doctor to care for your wounds. Contact your doctor if you experience any signs of infection, such as fever or increased redness at the surgical or wound site. Keep a Clean Home  · Clean or wipe commonly touched hard surfaces like door handles, sinks, tabletops, phones and TV remotes. · Use products labeled \"disinfectant\" to kill harmful bacteria and viruses. · Use a clean cloth or paper towel to clean and dry surfaces. Wiping surfaces with a dirty dishcloth, sponge or towel will only spread germs. · Never share toothbrushes, monsalve, drinking glasses, utensils, razor blades, face cloths or bath towels to avoid spreading germs. · Be sure that the linens that you sleep on are clean. · Keep pets away from wounds and wash your hands after touching pets, their toys or bedding. We care about you and your health. Remember, preventing infections is a   team effort between you, your family, friends and health care providers. Postpartum Support    PARENTS:  Are you feeling sad or depressed? Is it difficult for you to enjoy yourself? Do you feel more irritable or tense? Do you feel anxious or panicky? Are you having difficulty bonding with your baby? Do you feel as if you are \"out of control\" or \"going crazy\"? Are you worried that you might hurt your baby or yourself? FAMILIES: Do you worry that something is wrong but don't know how to help? Do you think that your partner or spouse is having problems coping? Are you worried that it may never get better?      While many women experience some mild mood change or \"the blues\" during or after the birth of a child, 1 in 7 women experience more significant symptoms of depression or anxiety. 1 in 10 Dads become depressed during the first year. Things you can do  Being a good parent includes taking care of yourself. If you take care of yourself, you will be able to take better care of your baby and your family. · Talk to a counselor or healthcare provider who has training in  mood and anxiety problems. · Learn as much as you can about pregnancy and postpartum depression and anxiety. · Get support from family and friends. Ask for help when you need it. · Join a support group in your area or online. · Keep active by walking, stretching or whatever form of exercise helps you to feel better. · Get enough rest and time for yourself. · Eat a healthy diet. · Don't give up! It may take more than one try to get the right help you need. These are general instructions for a healthy lifestyle:    No smoking/ No tobacco products/ Avoid exposure to second hand smoke    Surgeon General's Warning:  Quitting smoking now greatly reduces serious risk to your health. Obesity, smoking, and sedentary lifestyle greatly increases your risk for illness    A healthy diet, regular physical exercise & weight monitoring are important for maintaining a healthy lifestyle    Recognize signs and symptoms of STROKE:    F-face looks uneven    A-arms unable to move or move unevenly    S-speech slurred or non-existent    T-time-call 911 as soon as signs and symptoms begin - DO NOT go       back to bed or wait to see if you get better - TIME IS BRAIN. I have had the opportunity to make my options or choices for discharge. I have received and understand these instructions.

## 2020-04-15 NOTE — PROGRESS NOTES
Bedside and Verbal shift change report given to Florian Birmingham RN (oncoming nurse) by Jason Fletcher RN (offgoing nurse). Report included the following information SBAR, Kardex, Intake/Output, MAR, Accordion, Recent Results and Med Rec Status.

## 2020-04-15 NOTE — PROGRESS NOTES
1300 Discharge completed. Early discharge. Patient to call when ready to depart. 80 Pt off unit in stable condition via wheelchair with volunteers for discharge home per Dr. David Fields. Pt is aware to follow-up in 6 weeks. Prescriptions given to pt. Pt denies any HA, dizziness, N/V or pain at this time. Infant in car seat and discharged with mother.

## 2020-04-15 NOTE — PROGRESS NOTES
Post-Partum Day Number 1 Progress Note    Rigo Velascotyler Brown       Information for the patient's :  Blunt, Male Maribel Naik [[id number]]   Vaginal, Spontaneous   Patient doing well without significant complaint. Voiding without difficulty, normal lochia. Vitals:  Visit Vitals  /67 (BP 1 Location: Left arm, BP Patient Position: At rest)   Pulse 76   Temp 98.5 °F (36.9 °C)   Resp 16   Ht 5' 9\" (1.753 m)   Wt 116.1 kg (256 lb)   SpO2 100%   Breastfeeding Unknown   BMI 37.80 kg/m²     Temp (24hrs), Av.2 °F (36.8 °C), Min:98 °F (36.7 °C), Max:98.5 °F (36.9 °C)        Exam:   Patient without distress. FF @ U-2 NT                LE NT w/o edema    Labs:     Lab Results   Component Value Date/Time    WBC 7.8 2020 09:38 PM    WBC 9.9 2016 09:26 AM    HGB 10.2 (L) 04/15/2020 12:25 AM    HGB 11.1 (L) 2020 09:38 PM    HGB 11.4 (L) 2016 09:26 AM    HCT 32.5 (L) 04/15/2020 12:25 AM    HCT 34.1 (L) 2020 09:38 PM    HCT 36.2 2016 09:26 AM    PLATELET 160  09:38 PM    PLATELET 464  09:26 AM       Recent Results (from the past 24 hour(s))   HGB & HCT    Collection Time: 04/15/20 12:25 AM   Result Value Ref Range    HGB 10.2 (L) 11.5 - 16.0 g/dL    HCT 32.5 (L) 35.0 - 47.0 %         Assessment: PPD 1 s/p , stable    Plan:  1. Continue routine postpartum care  2.   Pt requests early discharge today, instructions reviewed    Osmin Davis DO, [address] [facility],[address] [facility]

## 2020-04-15 NOTE — LACTATION NOTE
This note was copied from a baby's chart. Mom states that she attempted to pump last evening but di not get any drops so was worried she had no milk and gave formula all night. States she did try to use nipple shield, due to flat, inverted nipples, but did not see any milk in the shield after a few minutes. Discussed normal progression of milk production. Mom will call with next feeding. Discussed breast feeding discharge information with mom. Breast Feeding Discharge Information    Chart shows numerous feedings, void, stool WNL. Discussed Importance of monitoring outputs and feedings on first week of  Breastfeeding. Discussed ways to tell if baby getting enough, ie  Voids and stools, by day 7, baby should have at least  4-6 wet diapers a day, change in color of stool to a seedy yellow, and return to birth wt within 2 weeks with a steady increase after that. .  Follow up with pediatrician visit for weight check in 1-2 days reviewed. Discussed Breast feeding support groups and encouraged to call Warm line number, [phone]  for any breast feeding questions or problems that arise. Please leave a message and let us know what is going on. We will return your call within 24 hours. Breast feeding Support groups meet at [facility] the first and third Wednesday of the month from 11-12 noon. Meetings are held in a classroom past the cafeteria on the first floor. Feedings  Encouraged mom to attempt feeding with baby led feeding cues. Just as sucking on fingers, rooting, mouthing. Looking for 8-12 feedings in 24 hours. Don't limit baby at breast, allow baby to come off breast on it's own. Baby may want to feed  often and may increase number of feedings on second day of life. Skin to skin encouraged. In 4-6 weeks, baby may go though a growth spurt and increase feedings for several days to increase your milk supply.       If baby doesn't nurse,  Mom should Pump or hand express drops, 12-18 drops, and give infant any expressed milk. If not pumping any milk, mom should contact pediatrician for possible need for supplementation. MOM's DIET    Discussed eating a healthy diet. Instructed mother to eat a variety of foods in order to get a well balanced diet. She should consume an extra 300-500 calories per day (more than her non-pregnant requirement.) These extra calories will help provide energy needed for optimal breast milk production. Mother also encouraged to \"drink to thirst\" and it is recommended that she drink fluids such as water and fruit/vegetable juice. Nutritious snacks should be available so that she can eat throughout the day to help satisfy her hunger and maintain a good milk supply. Continue taking your Prenatal vitamins as long as you breast feed. Engorgement Care Guidelines:  Anticipatory guidance shared. If breast become engorged, to help decrease engorgement. Frequent breastfeeding encouraged, cool packs around breast after nursing may help. May take motrin or Ibuprofen as ordered by your Doctor.       Call your doctor, midwife and/or lactation consultant if:   Joseph Vega is having no wet or dirty diapers    Baby has dark colored urine after day 3  (should be pale yellow to clear)    Baby has dark colored stools after day 4  (should be mustard yellow, with no meconium)    Baby has fewer wet/soiled diapers or nurses less   frequently than the goals listed here    Mom has symptoms of mastitis   (sore breast with fever, chills, flu-like aching)

## 2020-04-15 NOTE — DISCHARGE SUMMARY
OBSTETRIC DISCHARGE SUMMARY    Patient ID:  Yuly Brown  [id number]  65 y.o.  1990    Admit Date: 2020    Discharge Date: 4/15/2020     Admitting Physician: Beata Grande MD  Attending Physician: Tiny Jacinto MD    Admission Diagnoses: Normal labor [O80, Z37.9]    Discharge Diagnoses: Same, delivered  Information for the patient's :  Blunt, Male Mcdaniel [[id number]]             Additional Diagnoses: none. Principle Procedure:     Additional procedures:  none    [id number]       Maternal Labs:   Lab Results   Component Value Date/Time    HBsAg, External NEGATIVE 10/09/2019    HIV, External NEGATIVE 10/09/2019    Rubella, External IMMUNE 10/09/2019    RPR, External non reactive 2016 08:00 AM    GrBStrep, External Negative 2020           History of Present Illness:   OB History        2    Para   2    Term   2            AB        Living   2       SAB        TAB        Ectopic        Molar        Multiple   0    Live Births   2              Admitted for Increasingly painful contractions.      Hospital Course:   Routine and uncomplicated        Signed:  Keny Chin DO  4/15/2020  9:05 AM

## 2022-03-18 PROBLEM — Z37.9 NORMAL LABOR: Status: ACTIVE | Noted: 2020-04-13

## 2022-11-16 ENCOUNTER — HOSPITAL ENCOUNTER (EMERGENCY)
Age: 32
Discharge: HOME | End: 2022-11-16
Attending: EMERGENCY MEDICINE
Payer: COMMERCIAL

## 2022-11-16 VITALS
DIASTOLIC BLOOD PRESSURE: 67 MMHG | SYSTOLIC BLOOD PRESSURE: 134 MMHG | HEART RATE: 100 BPM | BODY MASS INDEX: 41.59 KG/M2 | HEIGHT: 67 IN | RESPIRATION RATE: 18 BRPM | TEMPERATURE: 99.5 F | OXYGEN SATURATION: 99 % | WEIGHT: 265 LBS

## 2022-11-16 DIAGNOSIS — B34.9 VIRAL SYNDROME: Primary | ICD-10-CM

## 2022-11-16 DIAGNOSIS — R05.1 ACUTE COUGH: ICD-10-CM

## 2022-11-16 LAB
COVID-19 RAPID TEST, COVR: NOT DETECTED
FLUAV AG NPH QL IA: NEGATIVE
FLUBV AG NOSE QL IA: NEGATIVE
SOURCE, COVRS: NORMAL

## 2022-11-16 PROCEDURE — 87804 INFLUENZA ASSAY W/OPTIC: CPT

## 2022-11-16 PROCEDURE — 99283 EMERGENCY DEPT VISIT LOW MDM: CPT

## 2022-11-16 PROCEDURE — 87635 SARS-COV-2 COVID-19 AMP PRB: CPT

## 2022-11-16 RX ORDER — BENZONATATE 200 MG/1
200 CAPSULE ORAL
Qty: 21 CAPSULE | Refills: 0 | Status: SHIPPED | OUTPATIENT
Start: 2022-11-16 | End: 2022-11-23

## 2022-11-16 NOTE — Clinical Note
[address]  [facility] EMERGENCY DEPT  [phone]  [phone]    Work/School Note    Date: 11/16/2022    To Whom It May concern:    Marielena Brown was seen and treated today in the emergency room by the following provider(s):  Attending Provider: Liane Howard MD  Nurse Practitioner: Idalia Jordan NP. Marielena Brown is excused from work/school on 11/16/2022 through 11/18/2022. She is medically clear to return to work/school on 11/19/2022.          Sincerely,          Haider Tovar NP

## 2022-11-16 NOTE — DISCHARGE INSTRUCTIONS
In addition to the prescribed Tessalon Perles, I recommend rotating Tylenol and ibuprofen for fever/chills, body aches, and headaches. I would recommend taking 600 to 800 mg ibuprofen every 6 hours as needed and 650 to 975 mg of Tylenol every 6 hours as needed.

## 2022-11-16 NOTE — ED PROVIDER NOTES
29-year-old female with no reported past sickle history presents ER today for evaluation of cough, congestion, fatigue, body aches, and chills x3 days. She states that her child recently had a viral-like syndrome. She took Renetta-Boaz at home with a little bit of improvement in her symptoms. She denies any chest pain or shortness of breath. History reviewed. No pertinent past medical history. History reviewed. No pertinent surgical history.       Family History:   Problem Relation Age of Onset    Hypertension Mother     Cancer Maternal Grandmother        Social History     Socioeconomic History    Marital status: SINGLE     Spouse name: Not on file    Number of children: Not on file    Years of education: Not on file    Highest education level: Not on file   Occupational History    Not on file   Tobacco Use    Smoking status: Some Days     Types: Cigarettes     Last attempt to quit: 8/1/2019     Years since quitting: 3.2    Smokeless tobacco: Never   Vaping Use    Vaping Use: Never used   Substance and Sexual Activity    Alcohol use: No    Drug use: No    Sexual activity: Yes     Partners: Male     Birth control/protection: None   Other Topics Concern     Service Not Asked    Blood Transfusions Not Asked    Caffeine Concern Not Asked    Occupational Exposure Not Asked    Hobby Hazards Not Asked    Sleep Concern Not Asked    Stress Concern Not Asked    Weight Concern Not Asked    Special Diet Not Asked    Back Care Not Asked    Exercise Not Asked    Bike Helmet Not Asked    Seat Belt Not Asked    Self-Exams Not Asked   Social History Narrative    Not on file     Social Determinants of Health     Financial Resource Strain: Not on file   Food Insecurity: Not on file   Transportation Needs: Not on file   Physical Activity: Not on file   Stress: Not on file   Social Connections: Not on file   Intimate Partner Violence: Not on file   Housing Stability: Not on file         ALLERGIES: Patient has no known allergies. Review of Systems   Constitutional:  Positive for chills and fatigue. HENT:  Positive for congestion. Respiratory:  Positive for cough. Musculoskeletal:  Positive for myalgias. All other systems reviewed and are negative. Vitals:    11/16/22 1233 11/16/22 1333   BP: 134/77 (!) 144/80   Pulse: 93 100   Resp: 20 18   Temp: 99.5 °F (37.5 °C)    SpO2: 100% 100%   Weight: 120.2 kg (265 lb)    Height: 5' 7\" (1.702 m)             Physical Exam  Vitals and nursing note reviewed. Constitutional:       General: She is not in acute distress. Appearance: Normal appearance. She is not ill-appearing. HENT:      Head: Normocephalic and atraumatic. Right Ear: External ear normal.      Left Ear: External ear normal.      Nose: Nose normal.      Mouth/Throat:      Mouth: Mucous membranes are moist.      Pharynx: Oropharynx is clear. Eyes:      General: No scleral icterus. Extraocular Movements: Extraocular movements intact. Conjunctiva/sclera: Conjunctivae normal.      Pupils: Pupils are equal, round, and reactive to light. Cardiovascular:      Rate and Rhythm: Regular rhythm. Tachycardia present. Pulses: Normal pulses. Heart sounds: Normal heart sounds. Pulmonary:      Effort: Pulmonary effort is normal. No tachypnea. Breath sounds: Normal breath sounds and air entry. Abdominal:      General: Abdomen is flat. Bowel sounds are normal.      Palpations: Abdomen is soft. Musculoskeletal:         General: Normal range of motion. Cervical back: Normal range of motion and neck supple. No rigidity. No muscular tenderness. Skin:     General: Skin is warm and dry. Coloration: Skin is not pale. Neurological:      General: No focal deficit present. Mental Status: She is alert and oriented to person, place, and time. Psychiatric:         Mood and Affect: Mood normal.         Behavior: Behavior normal.         Thought Content:  Thought content normal.         Judgment: Judgment normal.        [facility]     VITAL SIGNS:  Patient Vitals for the past 4 hrs:   Temp Pulse Resp BP SpO2   11/16/22 1333 -- 100 18 (!) 144/80 100 %   11/16/22 1233 99.5 °F (37.5 °C) 93 20 134/77 100 %         LABS:  Recent Results (from the past 6 hour(s))   COVID-19 RAPID TEST    Collection Time: 11/16/22  2:30 PM   Result Value Ref Range    Specimen source Nasopharyngeal      COVID-19 rapid test Not detected NOTD     INFLUENZA A+B VIRAL AGS    Collection Time: 11/16/22  2:30 PM   Result Value Ref Range    Influenza A Antigen Negative NEG      Influenza B Antigen Negative NEG          IMAGING:  No orders to display         Medications During Visit:  Medications - No data to display      DECISION MAKING:  Asia Brown is a 29 y.o. female who comes in as above. Negative COVID-19 and influenza. Discussed supportive care measures for symptoms. The clinical decision making for this encounter included ordering and interpreting the above diagnostic tests with comparison to prior studies that are within our EMR. Past medical and surgical histories were reviewed, as were records from recent outpatient and emergency department visits. The above results discussed and reviewed with the patient. Patient verbalized understanding of the care plan, including any changes to current outpatient medication regimen, discussed disease process, symptom control, and follow-up care. Return precautions reviewed. IMPRESSION:  1. Viral syndrome    2. Acute cough        DISPOSITION:  Discharged      Current Discharge Medication List        START taking these medications    Details   benzonatate (TESSALON) 200 mg capsule Take 1 Capsule by mouth three (3) times daily as needed for Cough for up to 7 days.   Qty: 21 Capsule, Refills: 0  Start date: 11/16/2022, End date: 11/23/2022              Follow-up Information       Follow up With Specialties Details Why Contact Info    Michael Bowen MD Obstetrics & Gynecology, Gynecology, Obstetrics  As needed [address] [facility]  [address]  [place] [phone]                The patient is asked to follow-up with their primary care provider in the next several days. They are to call tomorrow for an appointment. The patient is asked to return promptly for any increased concerns or worsening of symptoms. They can return to this emergency department or any other emergency department.       Procedures